# Patient Record
Sex: MALE | Race: WHITE
[De-identification: names, ages, dates, MRNs, and addresses within clinical notes are randomized per-mention and may not be internally consistent; named-entity substitution may affect disease eponyms.]

---

## 2020-03-21 ENCOUNTER — HOSPITAL ENCOUNTER (EMERGENCY)
Dept: HOSPITAL 62 - ER | Age: 40
Discharge: HOME | End: 2020-03-21
Payer: COMMERCIAL

## 2020-03-21 VITALS — SYSTOLIC BLOOD PRESSURE: 120 MMHG | DIASTOLIC BLOOD PRESSURE: 76 MMHG

## 2020-03-21 DIAGNOSIS — S52.571A: Primary | ICD-10-CM
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
## 2020-03-21 DIAGNOSIS — F17.210: ICD-10-CM

## 2020-03-21 PROCEDURE — 73110 X-RAY EXAM OF WRIST: CPT

## 2020-03-21 PROCEDURE — 29125 APPL SHORT ARM SPLINT STATIC: CPT

## 2020-03-21 PROCEDURE — 96375 TX/PRO/DX INJ NEW DRUG ADDON: CPT

## 2020-03-21 PROCEDURE — 96374 THER/PROPH/DIAG INJ IV PUSH: CPT

## 2020-03-21 PROCEDURE — 73200 CT UPPER EXTREMITY W/O DYE: CPT

## 2020-03-21 PROCEDURE — 99284 EMERGENCY DEPT VISIT MOD MDM: CPT
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
## 2020-03-21 NOTE — RADIOLOGY REPORT (SQ)
EXAM DESCRIPTION:  CT RT UPPER EXTREMITY WITHOUT



COMPLETED DATE/TIME:  3/21/2020 6:37 pm



REASON FOR STUDY:  right wrist pre surgical req by orthopedics.  Distal intra-articular radial fractu
re.



COMPARISON:  None.



TECHNIQUE:  Axial imaging performed through the right wrist with reformatted oblique coronal and obli
que sagittal imaging windowed for bone and soft tissues.

All CT scanners at this facility use dose modulation, iterative reconstruction, and/or weight based d
osing when appropriate to reduce radiation dose to as low as reasonably achievable (ALARA).

[facility]: Dose Right  [facility]: CareDose    [facility]: Dose Right    [facility]: Teradose 4D    [facility]: Smart Technologies



RADIATION DOSE:  CT Rad equipment meets quality standard of care and radiation dose reduction techniq
ues were employed. CTDIvol: 2.6 mGy. DLP: 52 mGy-cm. mGy.



LIMITATIONS:  None.



FINDINGS:  SOFT TISSUES: There is soft tissue swelling at the dorsal aspect of the mid carpus.  No brothers
bcutaneous abscess.

BONES: Acute impacted minimally displaced fracture of the distal radius with fracture fragments exten
ding to the intra-articular surface at the wrist joint.  Fracture line extends to the radial styloid 
process with there is a comminuted component.  The distal ulna is intact.  Bones of the mid carpus ar
e intact.  Normal wrist joint alignment.

OTHER: No other significant finding.



IMPRESSION:  Acute impacted comminuted intra-articular fracture distal radius.  Normal wrist joint al
ignment.  Associated soft tissue swelling.



TECHNICAL DOCUMENTATION:  JOB ID:  [id number]

Quality ID # [id number]: Final reports with documentation of one or more dose reduction techniques (e.g., Au
tomated exposure control, adjustment of the mA and/or kV according to patient size, use of iterative 
reconstruction technique)

 2011 [employer]- All Rights Reserved



Reading location - IP/workstation name: [id number]
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
